# Patient Record
(demographics unavailable — no encounter records)

---

## 2025-04-17 NOTE — ASSESSMENT
[FreeTextEntry1] : 28-year-old female presenting via telehealth today for follow-up visit for migraines and occipital neuralgia.  Migraines are doing well with Qulipta, she reports compliance with the medication however notices a drastic worsening of headaches if she misses a dose.  Is very happy with the Qulipta. In regards to her daily headaches, she feels that the tension headaches are still a problem, has restarted physical therapy but not seeing improvement with cyclobenzaprine or gabapentin as much as previously.  I discussed that these medications can have a buildup effect in regards to tolerance and cyclobenzaprine should not be taken daily if not needed as it would lose its effectiveness.  Encouraged the patient to try to only take cyclobenzaprine as needed for muscle spasm at night.  Also encouraged the patient to trial increasing gabapentin from 300 mg once in the morning to 300 mg twice a day once in the morning and once in the afternoon to help alleviate afternoon breakthrough pain.  Should patient find the increase in gabapentin helpful we will send in a new prescription for the appropriate dose.  Plan: - Increase gabapentin to 300 mg twice daily once in the morning and once at the afternoon for muscle spasm and tension headaches. - Decrease cyclobenzaprine to once or twice daily as needed for muscle spasm. - Continue Qulipta 60 mg daily for prevention of migraine headaches. - Continue Nurtec 75 mg daily as needed for treatment of migraine headaches. - Referred patient to pain management for treatment of neck pain, occipital neuralgia, muscle spasm. - Continue PT for chronic pain. - Renewed prescription for ondansetron 8 mg tablets twice daily as needed for nausea associated with migraines. - Follow-up in 3 months or sooner should the need arise.

## 2025-04-17 NOTE — HISTORY OF PRESENT ILLNESS
[Home] : at home, [unfilled] , at the time of the visit. [Medical Office: ([facility])___] : at the medical office located in  [Telehealth (audio & video)] : This visit was provided via telehealth using real-time 2-way audio visual technology. [Verbal consent obtained from patient] : the patient, [unfilled] [FreeTextEntry1] : 4/17/25: Patient presents via telehealth for follow-up visit for migraine headaches today.  Patient has been doing well with Qulipta but reports that she is still getting tension type headaches.  She has been having difficulty getting to the [place] office for trigger points and has not had them in several months.  She reports that the trigger points do help with the head and neck and jaw pain but do not help with the head and shoulder pain.  Patient has previously seen pain management reports last time she saw them was about 2 years ago and they were previously doing injections for these headaches. Patient is still taking gabapentin 300 mg in the mornings, does notice that there is some wear off later in the day but associated this to stress and daily fatigue.  She also reports that the cyclobenzaprine that she takes at night has been losing effectiveness, not working as well to help alleviate muscle tension.  She reports she has been taking this almost daily. Patient reports that rheumatology has reordered physical therapy for her, physical therapist noted that she is having significant spasm on the left side which correlates with her symptoms. The symptoms include a pulling or sharp pain at base/back of head. Usually on Left with occasional right side involvement.   Today: patient presents via telehealth for follow-up for migraine headaches.  Patient has been taking Qulipta, still getting tension headaches. Has still  needed the nurtec about twice per week. Stopped Aimovig due to start of Qulipta. has not had time to do ENT or Ophtho referral. She got MRI and Xrays for tethered cord symptoms, she said surgeon not concerned for retethering. Last injections with Randi was April, no significant improvement noted with this time.    4/2/24: Since last visit patient reports she has stopped the Topamax about 2 weeks ago, still getting daily headaches. Headache severity is better, frequency is more often, She feels they are more often tension headaches. Still taking nurtec EOD but not great with remembering to take it EOD for prevention, does better with taking it as needed. She reports getting a new TMJ mouth splint replaced about 2 weeks ago, but already chipped in the back and needs to have fixed again. She reports a resurgence of her tethered cord symptoms, including bladder and bowel control, and following up with previous surgeon for this. She also reports some sinus pressure with ear fullness and popping, and some blurred vision occasionally.